# Patient Record
Sex: MALE | Race: ASIAN | NOT HISPANIC OR LATINO | URBAN - METROPOLITAN AREA
[De-identification: names, ages, dates, MRNs, and addresses within clinical notes are randomized per-mention and may not be internally consistent; named-entity substitution may affect disease eponyms.]

---

## 2018-11-01 ENCOUNTER — EMERGENCY (EMERGENCY)
Facility: HOSPITAL | Age: 28
LOS: 0 days | Discharge: HOME | End: 2018-11-01
Admitting: PHYSICIAN ASSISTANT

## 2018-11-01 VITALS
SYSTOLIC BLOOD PRESSURE: 122 MMHG | DIASTOLIC BLOOD PRESSURE: 72 MMHG | RESPIRATION RATE: 18 BRPM | OXYGEN SATURATION: 98 % | HEART RATE: 88 BPM | TEMPERATURE: 98 F
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
## 2018-11-01 DIAGNOSIS — K01.1 IMPACTED TEETH: ICD-10-CM
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
## 2018-11-01 DIAGNOSIS — K08.89 OTHER SPECIFIED DISORDERS OF TEETH AND SUPPORTING STRUCTURES: ICD-10-CM

## 2018-11-01 NOTE — ED PROVIDER NOTE - ENMT, MLM
Head NC/AT, neck supple, Airway patent, Nasal mucosa clear. Mouth with normal mucosa. Throat has no vesicles, no oropharyngeal exudates and uvula is midline. Tooth #9 loose Tooth #10 impacted

## 2018-11-01 NOTE — ED PROVIDER NOTE - MUSCULOSKELETAL, MLM
Spine appears normal, range of motion is not limited, no midline or paraspinal tenderness, no muscle or joint tenderness

## 2018-11-01 NOTE — ED ADULT TRIAGE NOTE - SOURCE OF INFORMATION
Patient V-Y Flap Text: The defect edges were debeveled with a #15 scalpel blade.  Given the location of the defect, shape of the defect and the proximity to free margins a V-Y flap was deemed most appropriate.  Using a sterile surgical marker, an appropriate advancement flap was drawn incorporating the defect and placing the expected incisions within the relaxed skin tension lines where possible.    The area thus outlined was incised deep to adipose tissue with a #15 scalpel blade.  The skin margins were undermined to an appropriate distance in all directions utilizing iris scissors.

## 2018-11-01 NOTE — ED PROVIDER NOTE - OBJECTIVE STATEMENT
28 y.o. male presented to the ER c/o Left upper dental pain s/p fall last Friday.  Pt denies LOC, dizziness, fever, chills, dysphagia, SOB.  Presently on po amoxicillin.  No other complaints.

## 2018-11-01 NOTE — PROGRESS NOTE ADULT - SUBJECTIVE AND OBJECTIVE BOX
Patient is a 28y old  Male who presents with a chief complaint of intruded #10, and luxated #9 with grade 2 mobility.    HPI: 28 y.o. male presented to the ER c/o Left upper dental pain s/p fall last Friday.  Pt denies LOC, dizziness, fever, chills, dysphagia, SOB.  Presently on po amoxicillin.  No other complaints.      PAST MEDICAL & SURGICAL HISTORY:    ( -  ) heart valve replacement  ( -  ) joint replacement  Allergies: No Known Allergies    MEDICATIONS  (STANDING): none    MEDICATIONS  (PRN): none      REVIEW OF SYSTEMS      General:	    Skin/Breast:  	  Ophthalmologic:  	  ENMT:	    Respiratory and Thorax:  	  Cardiovascular:	    Gastrointestinal:	    Genitourinary:	    Musculoskeletal:	    Neurological:	    Psychiatric:	    Hematology/Lymphatics:	    Endocrine:	    Allergic/Immunologic:	    Allergies: No Known Allergies    Intolerances        FAMILY HISTORY: patient reports no pertinent family history.      *SOCIAL HISTORY: ( -  ) Tobacco; ( -  ) ETOH    Vital Signs Last 24 Hrs  T(C): 36.8 (01 Nov 2018 13:56), Max: 36.8 (01 Nov 2018 13:56)  T(F): 98.3 (01 Nov 2018 13:56), Max: 98.3 (01 Nov 2018 13:56)  HR: 88 (01 Nov 2018 13:56) (88 - 88)  BP: 122/72 (01 Nov 2018 13:56) (122/72 - 122/72)  BP(mean): --  RR: 18 (01 Nov 2018 13:56) (18 - 18)  SpO2: 98% (01 Nov 2018 13:56) (98% - 98%)    LABS: none ordered    Last Dental Visit: <<one day ago  >>    EOE:  TMJ ( -  ) clicks                     ( -  ) pops                     ( -  ) crepitus             Mandible <<FROM>>             Facial bones and MOM <<grossly intact>>             ( -  ) trismus             ( -  ) lymphadenopathy             ( -  ) swelling             ( -  ) asymmetry             ( -  ) palpation             ( -  ) dyspnea             ( -  ) dysphagia             ( -  ) loss of consciousness    IOE:  <<permanent>> dentition:            <<grossly intact>>                     Mobility <<#9  >>                hard/soft palate:  ( -  ) palatal torus, <<No pathology noted>>            tongue/FOM <<No pathology noted>>            labial/buccal mucosa <<No pathology noted>>           ( -  ) percussion           ( -  ) palpation           ( -  ) swelling            ( -  ) abscess           ( -  ) sinus tract    *DENTAL RADIOGRAPHS: 1 periapical xray    RADIOLOGY & ADDITIONAL STUDIES: none    *ASSESSMENT: #9 luxated with grade 2 mobility, and intrusion of #10.    *PLAN: Referred patient to orthodontist for evaluation for forced eruption of #10 via orthodontic.    PROCEDURE: exam  Verbal and written consent given.  Anesthesia: << none   >>   Treatment: << exam   >>     RECOMMENDATIONS:  1) F/U with outpatient orthodontist for evaluation of #10.  2) If any difficulty swallowing/breathing, fever occur, return to ER.     Jeanmarie Ortega, DMD  [phone]

## 2020-02-04 NOTE — ED ADULT TRIAGE NOTE - NS ED TRIAGE AVPU SCALE
Will postpone until Dr Willian Yen returns 2/10/20 Alert-The patient is alert, awake and responds to voice. The patient is oriented to time, place, and person. The triage nurse is able to obtain subjective information.